# Patient Record
Sex: FEMALE | Race: OTHER | HISPANIC OR LATINO | URBAN - METROPOLITAN AREA
[De-identification: names, ages, dates, MRNs, and addresses within clinical notes are randomized per-mention and may not be internally consistent; named-entity substitution may affect disease eponyms.]

---

## 2018-12-17 ENCOUNTER — EMERGENCY (EMERGENCY)
Facility: HOSPITAL | Age: 38
LOS: 1 days | Discharge: HOME | End: 2018-12-17
Admitting: EMERGENCY MEDICINE
Payer: MEDICAID

## 2018-12-17 VITALS
TEMPERATURE: 98 F | HEART RATE: 83 BPM | SYSTOLIC BLOOD PRESSURE: 106 MMHG | OXYGEN SATURATION: 100 % | RESPIRATION RATE: 18 BRPM | DIASTOLIC BLOOD PRESSURE: 70 MMHG

## 2018-12-17 VITALS
OXYGEN SATURATION: 100 % | SYSTOLIC BLOOD PRESSURE: 119 MMHG | DIASTOLIC BLOOD PRESSURE: 81 MMHG | HEART RATE: 72 BPM | RESPIRATION RATE: 21 BRPM | TEMPERATURE: 98 F

## 2018-12-17 PROCEDURE — 99284 EMERGENCY DEPT VISIT MOD MDM: CPT

## 2018-12-17 RX ORDER — IPRATROPIUM/ALBUTEROL SULFATE 18-103MCG
3 AEROSOL WITH ADAPTER (GRAM) INHALATION ONCE
Qty: 0 | Refills: 0 | Status: COMPLETED | OUTPATIENT
Start: 2018-12-17 | End: 2018-12-17

## 2018-12-17 RX ADMIN — Medication 3 MILLILITER(S): at 22:28

## 2018-12-17 RX ADMIN — Medication 3 MILLILITER(S): at 22:09

## 2018-12-17 RX ADMIN — Medication 200 MILLIGRAM(S): at 22:28

## 2018-12-17 RX ADMIN — Medication 50 MILLIGRAM(S): at 22:09

## 2018-12-17 NOTE — ED ADULT NURSE NOTE - OBJECTIVE STATEMENT
rec'd pt. a&ox4, came in c/o difficulty in breathing today. pt. reports she was dx with bronchitis 2 weeks ago, has been having cough (now non-productive), currently on Amoxicillin. pt. complaints, she was also given a pump but did not help her today. pt. also c/o sore throat, denies any fever. pt. speaking clearly, satting 100% on RA, slightly tachypneic, minimal crackles noted. pt. seen by MD. meds started as ordered. awaits further eval. will continue to monitor

## 2018-12-17 NOTE — CONSULT NOTE ADULT - SUBJECTIVE AND OBJECTIVE BOX
"Pt is a 38 y.o female w/ no known PMHx who presents to ED sent from  for c/o feeling sob w/ some chest tightness. Patient states she has been having URI sx for 2 weeks consisting of rhinorrhea, cough, states she went to  last saturday and had cxr that was normal and was d/c home with amoxicillin and inhaler and cough medicine with some relief. However states she continued to feel sob and had some chest tightness today, admits to trying to use inhaler multiple times today with no relief. Pt went to  tonight, had neb tx, felt little better but felt "lump in throat" so was sent to ED.  Pt denies drooling, dysphagia, n/v/d, chest pain, pleuritic pain, fevers, chills, neck pain, back pain, or any other complaints"    Per patient she started smoking 2 years ago and her symptoms in the last two weeks were often triggered by her smoking. She reports that it feels like her upper chest is tight and she has trouble expiring, not inspiring. She doesn't have any pain in her throat. No dysphagia. She also reports a dry cough that is worse at night and also triggered by cold air. She received albuterol nebs in ED and is feeling better    Exam  NAD, awake and alert  Breathing comfortably in RA  Voice normal  Nose clear b/l  OC/OP: tongue wnl, tonsils absent, OP clear, mild cobblestoning  Neck soft/flat    FIberoptic laryngoscopy  NP wnl  mild lingual tonsil hypertrophy  epiglottis wnl  AE folds nonedematous  Arytenoids mobile  TVC visible and mobile bilaterally, no masses or lesions  Mild postcricoid edema  Airway patent    A/P 38F with expiratory wheezing and throat tightness. Scope exam wnl. Clinical history and findings suspicious for a bronchitis vs asthma.  -no ENT intervention needed  -recommend outpatient pulm follow up for PFTs  -she should have albuterol pump to help with symptoms until she can be seen by pulm  -smoking cessation  -call/page with questions

## 2018-12-17 NOTE — ED PROVIDER NOTE - PHYSICAL EXAMINATION
Vital signs reviewed.   CONSTITUTIONAL: Well-appearing; well-nourished; in no apparent distress. Non-toxic appearing.   HEAD: Normocephalic, atraumatic.  EYES: PERRL, EOM intact, conjunctiva and sclera WNL.  ENT: normal nose; no rhinorrhea; normal pharynx with no tonsillar hypertrophy, no erythema, no exudate, no lymphadenopathy. No tongue elevation, no drooling, no ludwigs. Pt speaking in full sentences w.o difficulty.   NECK/LYMPH: Supple; non-tender; no cervical lymphadenopathy.  CARD: Normal S1, S2; no murmurs, rubs, or gallops noted.  RESP: Normal chest excursion with respiration; +decreased airflow b/l. No wheezes, rales noted.   ABD/GI: soft, non-distended; non-tender  EXT/MS: moves all extremities  SKIN: Normal for age and race; warm; dry; good turgor; no apparent lesions or exudate noted.  NEURO: Awake, alert, oriented x 3, no gross deficits  PSYCH: Normal mood; appropriate affect.

## 2018-12-17 NOTE — ED ADULT NURSE NOTE - NSIMPLEMENTINTERV_GEN_ALL_ED
Implemented All Universal Safety Interventions:  [place] to call system. Call bell, personal items and telephone within reach. Instruct patient to call for assistance. Room bathroom lighting operational. Non-slip footwear when patient is off stretcher. Physically safe environment: no spills, clutter or unnecessary equipment. Stretcher in lowest position, wheels locked, appropriate side rails in place.

## 2018-12-17 NOTE — ED ADULT TRIAGE NOTE - CHIEF COMPLAINT QUOTE
pt BIBA from urgent care.  pt sent to ED for evaluation of cough.  pt has had cough x 2 weeks.  pt is being treated with amoxacillin.

## 2018-12-17 NOTE — ED PROVIDER NOTE - PROGRESS NOTE DETAILS
Spoke with ENT resident, states he will come evaluate patient. Patient signed out to BINTA Alvarado who is aware of plan.

## 2018-12-17 NOTE — ED PROVIDER NOTE - OBJECTIVE STATEMENT
HPI: Pt is a 38 y.o female w/ no known PMHx who presents to ED sent from  for c/o feeling sob w/ some chest tightness. Patient states she has been having URI sx for 2 weeks consisting of rhinorrhea, cough, states she went to  last saturday and had cxr that was normal and was d/c home with amoxicillin and inhaler and cough medicine with some relief. However states she continued to feel sob and had some chest tightness today, admits to trying to use inhaler multiple times today with no relief. [facility], felt little better but felt "lump in throat" so was sent to ED.  Pt denies drooling, dysphagia, n/v/d, chest pain, pleuritic pain, fevers, chills, neck pain, back pain, or any other complaints.

## 2018-12-18 PROCEDURE — 70360 X-RAY EXAM OF NECK: CPT | Mod: 26

## 2018-12-18 PROCEDURE — 71046 X-RAY EXAM CHEST 2 VIEWS: CPT | Mod: 26

## 2023-03-28 NOTE — ED PROVIDER NOTE - MEDICAL DECISION MAKING DETAILS
Pt is a 38 y.o female w/ no known PMHx who presents to ED sent from  for c/o feeling sob w/ some chest tightness.   DDx- includes but not limited; URI sx, bronchitis, post nasal drip   Plan- cxr, soft tissue of neck, duonebs, robitussin.
no